# Patient Record
Sex: FEMALE | Race: OTHER | HISPANIC OR LATINO
[De-identification: names, ages, dates, MRNs, and addresses within clinical notes are randomized per-mention and may not be internally consistent; named-entity substitution may affect disease eponyms.]

---

## 2019-11-21 ENCOUNTER — APPOINTMENT (OUTPATIENT)
Dept: PHYSICAL MEDICINE AND REHAB | Facility: CLINIC | Age: 64
End: 2019-11-21
Payer: MEDICARE

## 2019-11-21 VITALS — BODY MASS INDEX: 25.61 KG/M2 | WEIGHT: 150 LBS | HEIGHT: 64 IN

## 2019-11-21 DIAGNOSIS — Z87.39 PERSONAL HISTORY OF OTHER DISEASES OF THE MUSCULOSKELETAL SYSTEM AND CONNECTIVE TISSUE: ICD-10-CM

## 2019-11-21 DIAGNOSIS — Z78.9 OTHER SPECIFIED HEALTH STATUS: ICD-10-CM

## 2019-11-21 DIAGNOSIS — Z86.69 PERSONAL HISTORY OF OTHER DISEASES OF THE NERVOUS SYSTEM AND SENSE ORGANS: ICD-10-CM

## 2019-11-21 PROBLEM — Z00.00 ENCOUNTER FOR PREVENTIVE HEALTH EXAMINATION: Status: ACTIVE | Noted: 2019-11-21

## 2019-11-21 PROCEDURE — 99204 OFFICE O/P NEW MOD 45 MIN: CPT

## 2019-11-21 RX ORDER — SERTRALINE HYDROCHLORIDE 25 MG/1
TABLET, FILM COATED ORAL
Refills: 0 | Status: ACTIVE | COMMUNITY

## 2019-11-21 RX ORDER — IBUPROFEN 200 MG/1
CAPSULE, LIQUID FILLED ORAL
Refills: 0 | Status: ACTIVE | COMMUNITY

## 2019-11-21 NOTE — PHYSICAL EXAM
[FreeTextEntry1] : ROMÁN is a 64 year female \par Constitutional: healthy appearing, NAD, and normal body habitus\par \par LUMBAR\par ROM: flexion to 20 deg, ext to 5 deg\par \par Gait: antalgic with rollator\par \par Inspection: no erythema, warmth\par Spine: no TTP in spinous process, SI joint, sacrum\par Bony palpation: no TTP in GT\par \par Soft tissue palpation hip: no TTP in gluteus anton, medius\par Soft tissue palpation of spine: no TTP in lumbar paraspinals\par \par 5/5 bilateral HF, KE, PF and left DF; 4+ right DF\par sensation intact in bilat LE\par reflexes: knee 2+ bilat and ankle 0 on right and 2+ on left\par \par Special tests: neg seated SLR\par \par \par \par NECK\par ROM: flexion to 20, ext to 20, rotation to 20 deg bilat\par \par Inspection: no erythema, warmth\par Spine: no TTP in spinous process\par Soft tissue palpation: no TTP in cervical paraspinals, rhomboids, trapezius\par \par 5/5 bilateral elb flex/ext, WE, and left finger flex; 4/5 right finger abd, 4+ right finger flex\par sensation intact in bilat UE\par reflexes:  biceps 0 and triceps 2+ bilat\par \par Special tests: neg Spurling, Denis\par \par

## 2019-11-21 NOTE — ASSESSMENT
[FreeTextEntry1] : MRI lumbar reviewed from [facility].  Xray cervical and thoracic reviewed from [facility].  She cannot tolerate RICKIE without sedation.  \par \par [facility]  [facility] - Neurologist recommended neurosurgical eval given cord compression.  F[facility].  [facility]
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
## 2019-11-26 ENCOUNTER — APPOINTMENT (OUTPATIENT)
Dept: ORTHOPEDIC SURGERY | Facility: CLINIC | Age: 64
End: 2019-11-26
Payer: MEDICARE

## 2019-11-26 PROCEDURE — 99215 OFFICE O/P EST HI 40 MIN: CPT

## 2019-11-27 ENCOUNTER — RX RENEWAL (OUTPATIENT)
Age: 64
End: 2019-11-27

## 2019-11-27 RX ORDER — MELOXICAM 7.5 MG/1
7.5 TABLET ORAL TWICE DAILY
Qty: 60 | Refills: 0 | Status: ACTIVE | COMMUNITY
Start: 1900-01-01 | End: 1900-01-01

## 2019-11-27 NOTE — ASSESSMENT
[FreeTextEntry1] : 64 year old female with h/o TBI and chronic low back pain presenting with right cervical radiculopathy and numbness.  She did not bring her cervical MRI report or images which were completed at an outside facility.  She will return with the images for review. This will help determine the cause of her symptoms and allow us to discuss treatment options. We will speak with her again once we have had the opportunity to review the images.  She knows to call with any questions or concerns or if her symptoms acutely worsen.

## 2019-11-27 NOTE — HISTORY OF PRESENT ILLNESS
[de-identified] : 64 year old female referred by Dr. Arriaga for cervical radiculopathy and findings on a cervical MRI.  She has a complicated history including a TBI she suffered during her service in the [employer].  She has also had previous surgery on her cervical spine: ACDF C5-C6 with subsequent hardware removal. She has chronic balance problems, low low back pain pain.  She also recently saw a neurologist who performed a nerve conduction study showing mild carpal tunnel on her right.  She did not bring her cervical images or report for review today.

## 2019-11-27 NOTE — PHYSICAL EXAM
[de-identified] : General: No acute distress, conversant, well-nourished.\par Head: Normocephalic, atraumatic\par Neck: trachea midline, FROM\par Heart: normotensive and normal rate and rhythm\par Lungs: No labored breathing\par Skin: No abrasions, no rashes, no edema\par Psych: Alert and oriented to person, place and time\par Extremities: no peripheral edema or digital cyanosis\par Vascular: warm and well perfused distally, palpable distal pulses\par \par MSK:\par Cervical Spine: \par Alignment normal. \par No tenderness to palpation.  No step-off, no deformity.\par No pain or neurologic symptoms with full active range of motion (flexion, extension, lateral bending and rotation).\par \par NEURO:\par Sensation \par          Left           \par C5     2/2               \par C6     2/2               \par C7     2/2               \par C8     2/2              \par T1     2/2             \par \par          Right         \par C5     1/2               \par C6     1/2               \par C7     1/2               \par C8     1/2              \par T1     1/2      \par \par Motor: \par                                                Left             \par C5 (deltoid abduction)             5/5               \par C6 (biceps flexion)                   5/5                \par C7 (triceps extension)             5/5               \par C8 (finger flexion)                     5/5               \par T1 (interosseous)                     4/5           \par \par                                                Right           \par C5 (deltoid abduction)             5/5               \par C6 (biceps flexion)                   5/5                \par C7 (triceps extension)             5/5               \par C8 (finger flexion)                     5/5               \par T1 (interosseous)                     5/5                     \par \par NEURO EXAM:\par Sensation \par Left L2  -  2/2            \par Left L3  -  2/2\par Left L4  -  2/2\par Left L5  -  2/2\par Left S1  -  2/2\par \par Right L2  -  2/2            \par Right L3  -  2/2\par Right L4  -  2/2\par Right L5  -  2/2\par Right S1  -  2/2\par \par Motor: \par Left L2 (hip flexion)                            5/5                \par Left L3 (knee extension)                   5/5                \par Left L4 (ankle dosiflexion)                 5/5                \par Left L5 (long toe extensor)                5/5                \par Left S1 (ankle plantar flexion)           5/5\par \par Right L2 (hip flexion)                            5/5                \par Right L3 (knee extension)                   5/5                \par Right L4 (ankle dosiflexion)                 5/5                \par Right L5 (long toe extensor)                5/5                \par Right S1 (ankle plantar flexion)           5/5\par \par Reflexes: Normal and symmetric\par Negative clonus.  Down-going Babinski.  \par Positive Spurling’s test.  Negative Denis’s reflex.  \par \par  [de-identified] : Cervical radiographs (12/19/18): Cervical spondylosis.  Previous fusion at C5-C6; hardware removed.  Disc degeneration most pronounced at C4-C5.\par \par Lumbar spine MRI (11/21/2019): Lumbar spondylosis.  Right L4-L5 disc protrusion contacting Right L5 nerve.  Small right L5-S1 foraminal disc protrusion contacting exiting L5 nerve root

## 2019-11-27 NOTE — HISTORY OF PRESENT ILLNESS
[de-identified] : 64 year old female referred by Dr. Arriaga for cervical radiculopathy and findings on a cervical MRI.  She has a complicated history including a TBI she suffered during her service in the [employer].  She has also had previous surgery on her cervical spine: ACDF C5-C6 with subsequent hardware removal. She has chronic balance problems, low low back pain pain.  She also recently saw a neurologist who performed a nerve conduction study showing mild carpal tunnel on her right.  She did not bring her cervical images or report for review today.

## 2019-11-27 NOTE — PHYSICAL EXAM
[de-identified] : General: No acute distress, conversant, well-nourished.\par Head: Normocephalic, atraumatic\par Neck: trachea midline, FROM\par Heart: normotensive and normal rate and rhythm\par Lungs: No labored breathing\par Skin: No abrasions, no rashes, no edema\par Psych: Alert and oriented to person, place and time\par Extremities: no peripheral edema or digital cyanosis\par Vascular: warm and well perfused distally, palpable distal pulses\par \par MSK:\par Cervical Spine: \par Alignment normal. \par No tenderness to palpation.  No step-off, no deformity.\par No pain or neurologic symptoms with full active range of motion (flexion, extension, lateral bending and rotation).\par \par NEURO:\par Sensation \par          Left           \par C5     2/2               \par C6     2/2               \par C7     2/2               \par C8     2/2              \par T1     2/2             \par \par          Right         \par C5     1/2               \par C6     1/2               \par C7     1/2               \par C8     1/2              \par T1     1/2      \par \par Motor: \par                                                Left             \par C5 (deltoid abduction)             5/5               \par C6 (biceps flexion)                   5/5                \par C7 (triceps extension)             5/5               \par C8 (finger flexion)                     5/5               \par T1 (interosseous)                     4/5           \par \par                                                Right           \par C5 (deltoid abduction)             5/5               \par C6 (biceps flexion)                   5/5                \par C7 (triceps extension)             5/5               \par C8 (finger flexion)                     5/5               \par T1 (interosseous)                     5/5                     \par \par NEURO EXAM:\par Sensation \par Left L2  -  2/2            \par Left L3  -  2/2\par Left L4  -  2/2\par Left L5  -  2/2\par Left S1  -  2/2\par \par Right L2  -  2/2            \par Right L3  -  2/2\par Right L4  -  2/2\par Right L5  -  2/2\par Right S1  -  2/2\par \par Motor: \par Left L2 (hip flexion)                            5/5                \par Left L3 (knee extension)                   5/5                \par Left L4 (ankle dosiflexion)                 5/5                \par Left L5 (long toe extensor)                5/5                \par Left S1 (ankle plantar flexion)           5/5\par \par Right L2 (hip flexion)                            5/5                \par Right L3 (knee extension)                   5/5                \par Right L4 (ankle dosiflexion)                 5/5                \par Right L5 (long toe extensor)                5/5                \par Right S1 (ankle plantar flexion)           5/5\par \par Reflexes: Normal and symmetric\par Negative clonus.  Down-going Babinski.  \par Positive Spurling’s test.  Negative Denis’s reflex.  \par \par  [de-identified] : Cervical radiographs (12/19/18): Cervical spondylosis.  Previous fusion at C5-C6; hardware removed.  Disc degeneration most pronounced at C4-C5.\par \par Lumbar spine MRI (11/21/2019): Lumbar spondylosis.  Right L4-L5 disc protrusion contacting Right L5 nerve.  Small right L5-S1 foraminal disc protrusion contacting exiting L5 nerve root

## 2019-12-31 ENCOUNTER — APPOINTMENT (OUTPATIENT)
Dept: ORTHOPEDIC SURGERY | Facility: CLINIC | Age: 64
End: 2019-12-31
Payer: MEDICARE

## 2019-12-31 DIAGNOSIS — M51.9 UNSPECIFIED THORACIC, THORACOLUMBAR AND LUMBOSACRAL INTERVERTEBRAL DISC DISORDER: ICD-10-CM

## 2019-12-31 PROCEDURE — 99213 OFFICE O/P EST LOW 20 MIN: CPT

## 2019-12-31 NOTE — PHYSICAL EXAM
[de-identified] : General: No acute distress, conversant, well-nourished.\par Head: Normocephalic, atraumatic\par Neck: trachea midline, FROM\par Heart: normotensive and normal rate and rhythm\par Lungs: No labored breathing\par Skin: No abrasions, no rashes, no edema\par Psych: Alert and oriented to person, place and time\par Extremities: no peripheral edema or digital cyanosis\par Vascular: warm and well perfused distally, palpable distal pulses\par \par MSK:\par Cervical Spine: \par Alignment normal. \par No tenderness to palpation. No step-off, no deformity.\par No pain or neurologic symptoms with full active range of motion (flexion, extension, lateral bending and rotation).\par \par NEURO:\par Sensation \par  Left \par C5 2/2 \par C6 2/2 \par C7 2/2 \par C8 2/2 \par T1 2/2 \par \par  Right \par C5 1/2 \par C6 1/2 \par C7 1/2 \par C8 1/2 \par T1 1/2 \par \par Motor: \par    Left \par C5 (deltoid abduction) 5/5 \par C6 (biceps flexion)  5/5 \par C7 (triceps extension) 5/5 \par C8 (finger flexion)  5/5 \par T1 (interosseous)  4/5 \par \par    Right \par C5 (deltoid abduction) 5/5 \par C6 (biceps flexion)  5/5 \par C7 (triceps extension) 5/5 \par C8 (finger flexion)  5/5 \par T1 (interosseous)  5/5  \par \par NEURO EXAM:\par Sensation \par Left L2 - 2/2 \par Left L3 - 2/2\par Left L4 - 2/2\par Left L5 - 2/2\par Left S1 - 2/2\par \par Right L2 - 2/2 \par Right L3 - 2/2\par Right L4 - 2/2\par Right L5 - 2/2\par Right S1 - 2/2\par \par Motor: \par Left L2 (hip flexion)  5/5 \par Left L3 (knee extension)  5/5 \par Left L4 (ankle dosiflexion)  5/5 \par Left L5 (long toe extensor) 5/5 \par Left S1 (ankle plantar flexion) 5/5\par \par Right L2 (hip flexion)  5/5 \par Right L3 (knee extension)  5/5 \par Right L4 (ankle dosiflexion)  5/5 \par Right L5 (long toe extensor) 5/5 \par Right S1 (ankle plantar flexion) 5/5\par \par Reflexes: Normal and symmetric\par Negative clonus. Down-going Babinski. \par Positive Spurling’s test. Negative Denis’s reflex.  [de-identified] : Cervical MRI (5/6/19): C3-C4 central disc bulge without cord compression, C4-C5 psterior central disc herniation caused mild central canal stenosis and prominent right foraminal narrowing (less on the left).  C5-C6 evidence of previous fusion, C6-C7 posterior broad disc herniation causing mild central stenosis.  Cervical spondylosis.  Chiari I malformation.

## 2019-12-31 NOTE — ASSESSMENT
[FreeTextEntry1] : 64 year old female with h/o C5-C6 ACDF returns for followup for neck pain, cervical radiculopathy, low back pain and lumbar radiculopathy.  She cannot tolerate a lumbar RICKIE without sedation.  She may also benefit from a cervical injection.  I will refer her to Dr. Luo for evaluation.  She also has intervertebral disc syndrome which is contributing to her pain.  She will followup in 2 months.  She knows to call with any questions or concerns or if her symptoms acutely worsen.

## 2019-12-31 NOTE — HISTORY OF PRESENT ILLNESS
[de-identified] : 64 year old female returns for followup.  She was seen by Dr. Arriaga for a lumbar epidural steroid injection however she cannot tolerate the procedure without sedation.  She has also brought her cervical MRI for review.  She complains primarily of neck pain and low back pain.

## 2019-12-31 NOTE — REVIEW OF SYSTEMS
[Negative] : Heme/Lymph [FreeTextEntry9] : neck pain radiating into her arm and low back pain radiating into her leg

## 2020-01-08 ENCOUNTER — APPOINTMENT (OUTPATIENT)
Dept: PHYSICAL MEDICINE AND REHAB | Facility: CLINIC | Age: 65
End: 2020-01-08
Payer: MEDICARE

## 2020-01-08 VITALS — HEIGHT: 64 IN | WEIGHT: 150 LBS | BODY MASS INDEX: 25.61 KG/M2

## 2020-01-08 DIAGNOSIS — M51.26 OTHER INTERVERTEBRAL DISC DISPLACEMENT, LUMBAR REGION: ICD-10-CM

## 2020-01-08 DIAGNOSIS — M54.12 RADICULOPATHY, CERVICAL REGION: ICD-10-CM

## 2020-01-08 DIAGNOSIS — M50.20 OTHER CERVICAL DISC DISPLACEMENT, UNSPECIFIED CERVICAL REGION: ICD-10-CM

## 2020-01-08 DIAGNOSIS — M54.16 RADICULOPATHY, LUMBAR REGION: ICD-10-CM

## 2020-01-08 PROCEDURE — 99214 OFFICE O/P EST MOD 30 MIN: CPT

## 2020-01-10 PROBLEM — M51.26 DISPLACEMENT OF LUMBAR INTERVERTEBRAL DISC WITHOUT MYELOPATHY: Status: ACTIVE | Noted: 2019-11-21

## 2020-01-10 PROBLEM — M54.16 LUMBAR RADICULITIS: Status: ACTIVE | Noted: 2019-11-21

## 2020-01-10 PROBLEM — M50.20 DISPLACEMENT OF CERVICAL INTERVERTEBRAL DISC WITHOUT MYELOPATHY: Status: ACTIVE | Noted: 2019-11-21

## 2020-01-10 NOTE — DATA REVIEWED
[FreeTextEntry1] :  Cervical radiographs (12/19/18): Cervical spondylosis. Previous fusion at C5-C6; hardware removed. Disc degeneration most pronounced at C4-C5.\par \par Lumbar spine MRI (11/21/2019): Lumbar spondylosis. Right L4-L5 disc protrusion contacting Right L5 nerve. Small right L5-S1 foraminal disc protrusion contacting exiting L5 nerve root. \par \par  Cervical MRI (5/6/19): C3-C4 central disc bulge without cord compression, C4-C5 psterior central disc herniation caused mild central canal stenosis and prominent right foraminal narrowing (less on the left). C5-C6 evidence of previous fusion, C6-C7 posterior broad disc herniation causing mild central stenosis. Cervical spondylosis. Chiari I malformation.

## 2020-01-10 NOTE — PHYSICAL EXAM
[Normal] : Oriented to person, place, and time, insight and judgement were intact and the affect was normal [4] : T1 small finger abduction 4/5 [5] : S1 ankle flexors 5/5 [FreeTextEntry1] : \par LUMBAR\par ROM: flexion to 20 deg, ext to 5 deg\par \par Gait: antalgic with rollator\par \par Inspection: no erythema, warmth\par Spine: no TTP in spinous process, SI joint, sacrum\par Bony palpation: no TTP in GT\par \par Soft tissue palpation hip: no TTP in gluteus anton, medius\par Soft tissue palpation of spine: no TTP in lumbar paraspinals\par \par 5/5 bilateral HF, KE, PF and left DF; 4+ right DF\par sensation intact in bilat LE\par reflexes: knee 2+ bilat and ankle 0 on right and 2+ on left\par \par Special tests: neg seated SLR\par \par \par \par NECK\par ROM: flexion to 20, ext to 20, rotation to 20 deg bilat\par \par Inspection: no erythema, warmth\par Spine: no TTP in spinous process\par Soft tissue palpation:  TTP in cervical paraspinals, rhomboids, trapezius\par \par 5/5 bilateral elb flex/ext, WE, and left finger flex; 4/5 right finger abd, 4+ right finger flex\par sensation decreased in RUE, intact in LUE\par reflexes: biceps 0 and triceps 2+ bilat\par \par Special tests: postiive Spurling on right, neg Denis\par  [de-identified] : \par
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
## 2020-01-10 NOTE — ASSESSMENT
[FreeTextEntry1] : Patient has history of TBI and therefor cannot make any decisions now. I gave her information about the epidural steroid injection. She will speak to the person who manages her care and get back to us. I recommended a Right C7/T1 ILESI without sedation, however patient states she cannot do this without sedation. I informed her of the risks of doing this with sedation, especially deep sedation. I told her at best she would get mild sedation so she is able to converse. She will get back to us. Regarding her lumbar pain and radicular symptoms, this can managed after.

## 2020-01-10 NOTE — REASON FOR VISIT
[Initial Eval - Existing Diagnosis] : an initial evaluation of an existing diagnosis [FreeTextEntry1] : lower back pain

## 2020-02-21 ENCOUNTER — APPOINTMENT (OUTPATIENT)
Dept: ORTHOPEDIC SURGERY | Facility: CLINIC | Age: 65
End: 2020-02-21

## 2020-02-25 ENCOUNTER — APPOINTMENT (OUTPATIENT)
Dept: ORTHOPEDIC SURGERY | Facility: CLINIC | Age: 65
End: 2020-02-25

## 2021-02-10 ENCOUNTER — TRANSCRIPTION ENCOUNTER (OUTPATIENT)
Age: 66
End: 2021-02-10

## 2022-10-10 ENCOUNTER — APPOINTMENT (OUTPATIENT)
Dept: OTOLARYNGOLOGY | Facility: CLINIC | Age: 67
End: 2022-10-10

## 2022-10-10 PROCEDURE — 92524 BEHAVRAL QUALIT ANALYS VOICE: CPT | Mod: GN

## 2022-10-10 PROCEDURE — 92507 TX SP LANG VOICE COMM INDIV: CPT | Mod: GN

## 2022-10-17 ENCOUNTER — APPOINTMENT (OUTPATIENT)
Dept: OTOLARYNGOLOGY | Facility: CLINIC | Age: 67
End: 2022-10-17

## 2022-10-17 PROCEDURE — 92507 TX SP LANG VOICE COMM INDIV: CPT | Mod: GN

## 2022-10-27 ENCOUNTER — APPOINTMENT (OUTPATIENT)
Dept: OTOLARYNGOLOGY | Facility: CLINIC | Age: 67
End: 2022-10-27

## 2022-10-27 PROCEDURE — 92507 TX SP LANG VOICE COMM INDIV: CPT | Mod: GN,KX

## 2022-11-21 ENCOUNTER — APPOINTMENT (OUTPATIENT)
Dept: OTOLARYNGOLOGY | Facility: CLINIC | Age: 67
End: 2022-11-21

## 2022-11-21 PROCEDURE — 92507 TX SP LANG VOICE COMM INDIV: CPT | Mod: GN,KX

## 2022-12-01 ENCOUNTER — APPOINTMENT (OUTPATIENT)
Dept: OTOLARYNGOLOGY | Facility: CLINIC | Age: 67
End: 2022-12-01

## 2022-12-01 PROCEDURE — 92507 TX SP LANG VOICE COMM INDIV: CPT | Mod: GN,KX

## 2022-12-22 ENCOUNTER — APPOINTMENT (OUTPATIENT)
Dept: OTOLARYNGOLOGY | Facility: CLINIC | Age: 67
End: 2022-12-22

## 2023-07-11 ENCOUNTER — APPOINTMENT (OUTPATIENT)
Dept: OBGYN | Facility: CLINIC | Age: 68
End: 2023-07-11
Payer: MEDICARE

## 2023-07-11 VITALS
WEIGHT: 124 LBS | BODY MASS INDEX: 21.17 KG/M2 | HEIGHT: 64 IN | DIASTOLIC BLOOD PRESSURE: 75 MMHG | SYSTOLIC BLOOD PRESSURE: 118 MMHG | HEART RATE: 76 BPM

## 2023-07-11 DIAGNOSIS — S06.9XAA UNSPECIFIED INTRACRANIAL INJURY WITH LOSS OF CONSCIOUSNESS STATUS UNKNOWN, INITIAL ENCOUNTER: ICD-10-CM

## 2023-07-11 DIAGNOSIS — Z12.39 ENCOUNTER FOR OTHER SCREENING FOR MALIGNANT NEOPLASM OF BREAST: ICD-10-CM

## 2023-07-11 DIAGNOSIS — Z01.419 ENCOUNTER FOR GYNECOLOGICAL EXAMINATION (GENERAL) (ROUTINE) W/OUT ABNORMAL FINDINGS: ICD-10-CM

## 2023-07-11 DIAGNOSIS — R39.9 UNSPECIFIED SYMPTOMS AND SIGNS INVOLVING THE GENITOURINARY SYSTEM: ICD-10-CM

## 2023-07-11 DIAGNOSIS — Z86.018 PERSONAL HISTORY OF OTHER BENIGN NEOPLASM: ICD-10-CM

## 2023-07-11 DIAGNOSIS — Z82.5 FAMILY HISTORY OF ASTHMA AND OTHER CHRONIC LOWER RESPIRATORY DISEASES: ICD-10-CM

## 2023-07-11 DIAGNOSIS — R92.2 INCONCLUSIVE MAMMOGRAM: ICD-10-CM

## 2023-07-11 PROCEDURE — G0101: CPT

## 2023-07-11 RX ORDER — LORAZEPAM 2 MG/1
TABLET ORAL
Refills: 0 | Status: DISCONTINUED | COMMUNITY
End: 2023-07-11
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
## 2023-07-12 LAB — HPV HIGH+LOW RISK DNA PNL CVX: NOT DETECTED

## 2023-07-13 ENCOUNTER — NON-APPOINTMENT (OUTPATIENT)
Age: 68
End: 2023-07-13

## 2023-07-13 LAB — BACTERIA UR CULT: NORMAL

## 2023-07-14 ENCOUNTER — NON-APPOINTMENT (OUTPATIENT)
Age: 68
End: 2023-07-14

## 2023-07-16 LAB — CYTOLOGY CVX/VAG DOC THIN PREP: ABNORMAL

## 2023-10-06 ENCOUNTER — NON-APPOINTMENT (OUTPATIENT)
Age: 68
End: 2023-10-06

## 2023-10-06 ENCOUNTER — APPOINTMENT (OUTPATIENT)
Dept: ENDOCRINOLOGY | Facility: CLINIC | Age: 68
End: 2023-10-06
Payer: MEDICARE

## 2023-10-06 VITALS
WEIGHT: 124 LBS | HEIGHT: 64 IN | BODY MASS INDEX: 21.17 KG/M2 | DIASTOLIC BLOOD PRESSURE: 62 MMHG | TEMPERATURE: 98.2 F | HEART RATE: 63 BPM | OXYGEN SATURATION: 98 % | SYSTOLIC BLOOD PRESSURE: 97 MMHG

## 2023-10-06 DIAGNOSIS — M81.0 AGE-RELATED OSTEOPOROSIS W/OUT CURRENT PATHOLOGICAL FRACTURE: ICD-10-CM

## 2023-10-06 DIAGNOSIS — Z78.9 OTHER SPECIFIED HEALTH STATUS: ICD-10-CM

## 2023-10-06 PROCEDURE — 99204 OFFICE O/P NEW MOD 45 MIN: CPT | Mod: 25

## 2023-10-06 PROCEDURE — 36415 COLL VENOUS BLD VENIPUNCTURE: CPT

## 2023-10-06 RX ORDER — METHYLPHENIDATE HYDROCHLORIDE 5 MG/1
TABLET ORAL
Refills: 0 | Status: ACTIVE | COMMUNITY

## 2023-10-06 RX ORDER — TOPIRAMATE 25 MG/1
25 TABLET, FILM COATED ORAL
Refills: 0 | Status: DISCONTINUED | COMMUNITY
End: 2023-10-06

## 2023-10-09 LAB
25(OH)D3 SERPL-MCNC: 62.8 NG/ML
ALBUMIN SERPL ELPH-MCNC: 4.4 G/DL
ALP BLD-CCNC: 73 U/L
ALT SERPL-CCNC: 16 U/L
ANION GAP SERPL CALC-SCNC: 11 MMOL/L
AST SERPL-CCNC: 20 U/L
BILIRUB SERPL-MCNC: 0.5 MG/DL
BUN SERPL-MCNC: 17 MG/DL
CALCIUM SERPL-MCNC: 9.2 MG/DL
CALCIUM SERPL-MCNC: 9.2 MG/DL
CHLORIDE SERPL-SCNC: 104 MMOL/L
CO2 SERPL-SCNC: 25 MMOL/L
CREAT SERPL-MCNC: 0.69 MG/DL
EGFR: 94 ML/MIN/1.73M2
GLUCOSE SERPL-MCNC: 72 MG/DL
PARATHYROID HORMONE INTACT: 25 PG/ML
POTASSIUM SERPL-SCNC: 4.3 MMOL/L
PROT SERPL-MCNC: 6.3 G/DL
SODIUM SERPL-SCNC: 140 MMOL/L
TSH SERPL-ACNC: 2.01 UIU/ML